# Patient Record
Sex: MALE | Race: AMERICAN INDIAN OR ALASKA NATIVE
[De-identification: names, ages, dates, MRNs, and addresses within clinical notes are randomized per-mention and may not be internally consistent; named-entity substitution may affect disease eponyms.]

---

## 2016-12-30 NOTE — EMERGENCY DEPARTMENT REPORT
ED Psych HPI





- General


Chief Complaint: Psych


Stated Complaint: MH EVAL


Time Seen by Provider: 12/30/16 16:37


Source: family, police


Mode of arrival: Ambulatory


Limitations: Altered Mental Status





- History of Present Illness


Initial Comments: 





46-year-old male presents to the emergency Department via law enforcement for 

mental health evaluation.  Per report, the patient has not slept in 3 days.  

Law enforcement reports that the patient was somewhat confrontational with 

them.  Further history is unable to be obtained from the patient due to his 

current clinical condition.


-: unknown


Associated Psychiatric Symptoms: racing thoughts


Quality: constant


Improves With: none


Worsens With: none


Treatments Prior to Arrival: placed on mental he





- Related Data


 Allergies











Allergy/AdvReac Type Severity Reaction Status Date / Time


 


Unable to Assess Allergy   Unverified 12/30/16 16:24














ED Review of Systems


ROS: 


Stated complaint: MH EVAL


Other details as noted in HPI





Comment: Unobtainable due to pts medical conditions





ED Past Medical Hx





- Past Medical History


Previous Medical History?: Yes


Hx Psychiatric Treatment: Yes (bipolar)





- Surgical History


Past Surgical History?: No





- Social History


Smoking Status: Never Smoker





ED Physical Exam





- General


Limitations: No Limitations


General appearance: alert





- Head


Head exam: Present: atraumatic, normocephalic





- Eye


Eye exam: Present: normal appearance, PERRL, EOMI





- ENT


ENT exam: Present: normal exam, normal orophraynx, mucous membranes moist





- Neck


Neck exam: Present: normal inspection, full ROM.  Absent: tenderness





- Respiratory


Respiratory exam: Present: normal lung sounds bilaterally.  Absent: respiratory 

distress





- Cardiovascular


Cardiovascular Exam: Present: normal rhythm, tachycardia, normal heart sounds





- GI/Abdominal


GI/Abdominal exam: Present: soft, normal bowel sounds.  Absent: distended, 

tenderness





- Extremities Exam


Extremities exam: Present: normal inspection, full ROM.  Absent: tenderness





- Back Exam


Back exam: Present: normal inspection, full ROM.  Absent: tenderness





- Neurological Exam


Neurological exam: Present: alert.  Absent: motor sensory deficit





- Psychiatric


Psychiatric exam: Present: manic, other (patient is hyperverbal with racing, 

tangential thoughts.  Patient not cooperative with questioning.)





- Skin


Skin exam: Present: warm, dry, intact





ED Course


 Vital Signs











  12/30/16 12/30/16





  16:46 16:55


 


Temperature 98.1 F 


 


Pulse Rate 148 H 


 


Respiratory  18





Rate  


 


Blood Pressure 136/96 


 


O2 Sat by Pulse 100 100





Oximetry  














- Reevaluation(s)


Reevaluation #1: 





12/30/16 17:01


Form 1013 has been signed and placed on the patient's chart.  IM Geodon was 

administered.  Checking labs for medical clearance.  Patient will need mental 

health evaluation.


Reevaluation #2: 





12/30/16 17:29


Patient has been medically cleared and evaluated by mental health.  Patient is 

currently awaiting placement.





ED Medical Decision Making





- Lab Data


Result diagrams: 


 12/30/16 16:26





 12/30/16 16:26





- Differential Diagnosis


bipolar disorder, oni


Critical care attestation.: 


If time is entered above; I have spent that time in minutes in the direct care 

of this critically ill patient, excluding procedure time.








ED Disposition


Clinical Impression: 


 Bipolar disorder with severe oni


Disposition: DC/TX PSY HOSP/PSY UNIT


Is pt being admited?: No


Condition: Stable


Time of Disposition: 17:30

## 2016-12-31 NOTE — EMERGENCY DEPARTMENT REPORT
Blank Doc





- Documentation


Documentation: 





Patient's medical record reviewed.  Called the lab to verify values: sodium is 

137, potassium 4.0, chloride 95.5, and anion gap of 29.  Patient was medicated 

initially upon arrival but no standing psychiatric medications ordered.  

Tachycardic at 148 and agitated upon arrival with most recent heart rate 112 

vitals this a.m.  Requested that nurse to repeat heart rate.  Patient awaiting 

acceptance.





Heart rate was 106 when rechecked.  I initiated IV fluids 1 L of normal saline.

  Went to room to assess patient while lying supine receiving IV fluids heart 

rate 96 and increases to 110 while I'm speaking to him.  Patient states he has 

a history of elevated heart rate chronically.  He denies chest pain, shortness 

of breath, Edema, calf tenderness, recent travel, or history of DVT.  Patient 

is currently oriented and cooperative.  He states he has a history of psychosis 

times one day then returns to normal and does not feel that he needs 

hospitalization.  He is requested to be discharged with rescinding of 1013 at 

this time.  I informed him I cannot rescind 1013 at this time however, instruct 

the nurse to order a CNS specialist consult for possible discharge tomorrow 

upon psychiatric clearance.

## 2017-01-01 NOTE — EMERGENCY DEPARTMENT REPORT
Blank Doc





- Documentation


Documentation: 





Vital signs reviewed and have improved with improvement in heart rate.  Patient 

has remained calm and cooperative to the ED.  Yesterday I instructed nurse 

Kristi to order a clinical nurse specialist.  The patient has not been 

performed.  At this time I have instructed the current nurse Earl Snyder to 

order the clinical nurse specialist evaluation for today since patient is no 

longer psychotic and cooperative and has not required additional medications.

## 2017-01-02 NOTE — EMERGENCY DEPARTMENT REPORT
Blank Doc





- Documentation


Documentation: 





Patient has been reassessed by mental health and has returned to his baseline 

mental status.  Recommendations are to rescind the 1013.  Reevaluation of the 

patient shows the patient is alert and oriented.  He is exhibiting no signs of 

psychosis at this time.  Form 1013 will be rescinded and the patient will be 

discharged home to follow-up as an outpatient.

## 2021-12-26 ENCOUNTER — HOSPITAL ENCOUNTER (EMERGENCY)
Dept: HOSPITAL 5 - ED | Age: 51
LOS: 1 days | Discharge: HOME | End: 2021-12-27
Payer: SELF-PAY

## 2021-12-26 DIAGNOSIS — F29: Primary | ICD-10-CM

## 2021-12-26 DIAGNOSIS — F17.200: ICD-10-CM

## 2021-12-26 DIAGNOSIS — F31.9: ICD-10-CM

## 2021-12-26 LAB
BASOPHILS # (AUTO): 0.1 K/MM3 (ref 0–0.1)
BASOPHILS NFR BLD AUTO: 0.6 % (ref 0–1.8)
BUN SERPL-MCNC: 19 MG/DL (ref 9–20)
BUN/CREAT SERPL: 14 %
CALCIUM SERPL-MCNC: 9.6 MG/DL (ref 8.4–10.2)
EOSINOPHIL # BLD AUTO: 0.3 K/MM3 (ref 0–0.4)
EOSINOPHIL NFR BLD AUTO: 3.2 % (ref 0–4.3)
HCT VFR BLD CALC: 48.1 % (ref 35.5–45.6)
HEMOLYSIS INDEX: 4
HGB BLD-MCNC: 15.2 GM/DL (ref 11.8–15.2)
LYMPHOCYTES # BLD AUTO: 2.7 K/MM3 (ref 1.2–5.4)
LYMPHOCYTES NFR BLD AUTO: 31.3 % (ref 13.4–35)
MCHC RBC AUTO-ENTMCNC: 32 % (ref 32–34)
MCV RBC AUTO: 81 FL (ref 84–94)
MONOCYTES # (AUTO): 0.7 K/MM3 (ref 0–0.8)
MONOCYTES % (AUTO): 8 % (ref 0–7.3)
PLATELET # BLD: 343 K/MM3 (ref 140–440)
RBC # BLD AUTO: 5.97 M/MM3 (ref 3.65–5.03)

## 2021-12-26 PROCEDURE — 80048 BASIC METABOLIC PNL TOTAL CA: CPT

## 2021-12-26 PROCEDURE — 80307 DRUG TEST PRSMV CHEM ANLYZR: CPT

## 2021-12-26 PROCEDURE — 36415 COLL VENOUS BLD VENIPUNCTURE: CPT

## 2021-12-26 PROCEDURE — 85025 COMPLETE CBC W/AUTO DIFF WBC: CPT

## 2021-12-26 PROCEDURE — 99283 EMERGENCY DEPT VISIT LOW MDM: CPT

## 2021-12-26 PROCEDURE — 80320 DRUG SCREEN QUANTALCOHOLS: CPT

## 2021-12-26 PROCEDURE — 81001 URINALYSIS AUTO W/SCOPE: CPT

## 2021-12-26 PROCEDURE — G0480 DRUG TEST DEF 1-7 CLASSES: HCPCS

## 2021-12-26 NOTE — EMERGENCY DEPARTMENT REPORT
HPI





- HPI


HPI: 





[facility]








The patient is a 51-year-old male present with a chief complaint of "agitation. 

"The patient lives at a group home and apparently 911 was called secondary to 

the patient being agitated.  No specifics about this agitation were given.  The 

patient is asked what happened he initially remained silent but smiles.  The 

patient eventually states "I suffer from psychosis" And then stops and smiles 

again.  When prompted again the patient replies "I guess I was excited about 

tomorrow" And then stops and smiles and does not answer any further questions 

except for denying suicidal ideation





<JULI WREN - Last Filed: 21 01:10>





<CHAVA TEJEDA - Last Filed: 21 11:20>





- General


Chief Complaint: Psych


Time Seen by Provider: 21 22:34





ED Past Medical Hx





- Past Medical History


Previous Medical History?: No


Hx Psychiatric Treatment: Yes (bipolar)





- Surgical History


Past Surgical History?: No





- Family History


Family history: no significant





- Social History


Smoking Status: Current Every Day Smoker (2/3 pack/day)


Substance Use Type: None





<JULI WREN - Last Filed: 21 01:10>





<CHAVA TEJEDA - Last Filed: 21 11:20>





- Medications


Home Medications: 


                                Home Medications











 Medication  Instructions  Recorded  Confirmed  Last Taken  Type


 


Quetiapine Fumarate [SEROquel] 400 mg PO QHS #30 tablet 21  Unknown Rx














ED Review of Systems


ROS: 


Stated complaint: psychosis


Other details as noted in HPI





Constitutional: no symptoms reported


Eyes: denies: eye pain


ENT: denies: throat pain


Respiratory: no symptoms reported


Cardiovascular: denies: chest pain


Endocrine: no symptoms reported


Gastrointestinal: denies: abdominal pain


Genitourinary: denies: dysuria


Neurological: denies: headache


Psychiatric: denies: suicidal thoughts





<JULI WREN - Last Filed: 21 01:10>


ROS: 


Stated complaint: psychosis


Other details as noted in HPI








<CHAVA TEJEDA - Last Filed: 21 11:20>





Physical Exam





- Physical Exam


Vital Signs: 


                                   Vital Signs











  21





  22:24


 


Temperature 98.4 F


 


Pulse Rate 112 H


 


Respiratory 18





Rate 


 


Blood Pressure 123/83





[Right] 


 


O2 Sat by Pulse 96





Oximetry 











Physical Exam: 





GENERAL: The patient is well-developed well-nourished male lying on stretcher 

not appearing to be in acute distress. []


HEENT: Normocephalic.  Atraumatic.  Extraocular motions are intact.  Patient has

 moist mucous membranes.


NECK: Supple.  Trachea midline


CHEST/LUNGS: Clear to auscultation.  There is no respiratory distress noted.


HEART/CARDIOVASCULAR: Regular.  There is no tachycardia.  There is no gallop rub

 or murmur.


ABDOMEN: Abdomen is soft, nontender.  Patient has normal bowel sounds.  There is

 no abdominal distention.


SKIN: There is no rash.  There is no edema.  There is no diaphoresis.


NEURO: The patient is awake, alert, and oriented.  The patient is cooperative.  

The patient has no focal neurologic deficits.  The patient has normal speech.  

GCS 15


MUSCULOSKELETAL: There is no evidence of acute injury.





<JULI WREN K - Last Filed: 21 01:10>





- Physical Exam


Vital Signs: 


                                   Vital Signs











  21





  22:24 01:07 01:52


 


Temperature 98.4 F  98.4 F


 


Pulse Rate 112 H  103 H


 


Respiratory 18  18





Rate   


 


Blood Pressure 123/83  100/79





[Right]   


 


O2 Sat by Pulse 96 98 98





Oximetry   














  21





  02:00


 


Temperature 98.4 F


 


Pulse Rate 103 H


 


Respiratory 18





Rate 


 


Blood Pressure 100/79





[Right] 


 


O2 Sat by Pulse 98





Oximetry 














<CHAVA TEJEDA C - Last Filed: 21 11:20>





ED Course


                                   Vital Signs











  21





  22:24


 


Temperature 98.4 F


 


Pulse Rate 112 H


 


Respiratory 18





Rate 


 


Blood Pressure 123/83





[Right] 


 


O2 Sat by Pulse 96





Oximetry 














<JULI WREN K - Last Filed: 21 01:10>


                                   Vital Signs











  21





  22:24 01:07 01:52


 


Temperature 98.4 F  98.4 F


 


Pulse Rate 112 H  103 H


 


Respiratory 18  18





Rate   


 


Blood Pressure 123/83  100/79





[Right]   


 


O2 Sat by Pulse 96 98 98





Oximetry   














  21





  02:00


 


Temperature 98.4 F


 


Pulse Rate 103 H


 


Respiratory 18





Rate 


 


Blood Pressure 100/79





[Right] 


 


O2 Sat by Pulse 98





Oximetry 














<CHAVA TEJEDA LANA - Last Filed: 21 11:20>





ED Medical Decision Making





- Lab Data


Result diagrams: 


                                 21 23:01





                                 21 23:01





                                Laboratory Tests











  21





  23:01 23:01 23:01


 


WBC  8.7  


 


RBC  5.97 H  


 


Hgb  15.2  


 


Hct  48.1 H  


 


MCV  81 L  


 


MCH  26 L  


 


MCHC  32  


 


RDW  16.5 H  


 


Plt Count  343  


 


Lymph % (Auto)  31.3  


 


Mono % (Auto)  8.0 H  


 


Eos % (Auto)  3.2  


 


Baso % (Auto)  0.6  


 


Lymph # (Auto)  2.7  


 


Mono # (Auto)  0.7  


 


Eos # (Auto)  0.3  


 


Baso # (Auto)  0.1  


 


Seg Neutrophils %  56.9  


 


Seg Neutrophils #  4.9  


 


Sodium   140 


 


Potassium   4.6 


 


Chloride   103.3 


 


Carbon Dioxide   25 


 


Anion Gap   16 


 


BUN   19 


 


Creatinine   1.4 H 


 


Estimated GFR   > 60 


 


BUN/Creatinine Ratio   14 


 


Glucose   114 H 


 


Calcium   9.6 


 


Urine Color   


 


Urine Turbidity   


 


Urine pH   


 


Ur Specific Gravity   


 


Urine Protein   


 


Urine Glucose (UA)   


 


Urine Ketones   


 


Urine Blood   


 


Urine Nitrite   


 


Urine Bilirubin   


 


Urine Urobilinogen   


 


Ur Leukocyte Esterase   


 


Urine WBC (Auto)   


 


Urine RBC (Auto)   


 


Urine Mucus   


 


Salicylates    < 0.3 L


 


Urine Opiates Screen   


 


Urine Methadone Screen   


 


Acetaminophen   


 


Ur Barbiturates Screen   


 


Ur Phencyclidine Scrn   


 


Ur Amphetamines Screen   


 


U Benzodiazepines Scrn   


 


Urine Cocaine Screen   


 


U Marijuana (THC) Screen   


 


Drugs of Abuse Note   


 


Plasma/Serum Alcohol   














  21





  23:01 23:01 00:41


 


WBC   


 


RBC   


 


Hgb   


 


Hct   


 


MCV   


 


MCH   


 


MCHC   


 


RDW   


 


Plt Count   


 


Lymph % (Auto)   


 


Mono % (Auto)   


 


Eos % (Auto)   


 


Baso % (Auto)   


 


Lymph # (Auto)   


 


Mono # (Auto)   


 


Eos # (Auto)   


 


Baso # (Auto)   


 


Seg Neutrophils %   


 


Seg Neutrophils #   


 


Sodium   


 


Potassium   


 


Chloride   


 


Carbon Dioxide   


 


Anion Gap   


 


BUN   


 


Creatinine   


 


Estimated GFR   


 


BUN/Creatinine Ratio   


 


Glucose   


 


Calcium   


 


Urine Color    Yellow


 


Urine Turbidity    Clear


 


Urine pH    5.0


 


Ur Specific Gravity    1.025


 


Urine Protein    <15 mg/dl


 


Urine Glucose (UA)    Neg


 


Urine Ketones    Neg


 


Urine Blood    Neg


 


Urine Nitrite    Neg


 


Urine Bilirubin    Neg


 


Urine Urobilinogen    < 2.0


 


Ur Leukocyte Esterase    Neg


 


Urine WBC (Auto)    1.0


 


Urine RBC (Auto)    1.0


 


Urine Mucus    Few


 


Salicylates   


 


Urine Opiates Screen   


 


Urine Methadone Screen   


 


Acetaminophen  5.0 L  


 


Ur Barbiturates Screen   


 


Ur Phencyclidine Scrn   


 


Ur Amphetamines Screen   


 


U Benzodiazepines Scrn   


 


Urine Cocaine Screen   


 


U Marijuana (THC) Screen   


 


Drugs of Abuse Note   


 


Plasma/Serum Alcohol   < 0.01 














  21





  00:41


 


WBC 


 


RBC 


 


Hgb 


 


Hct 


 


MCV 


 


MCH 


 


MCHC 


 


RDW 


 


Plt Count 


 


Lymph % (Auto) 


 


Mono % (Auto) 


 


Eos % (Auto) 


 


Baso % (Auto) 


 


Lymph # (Auto) 


 


Mono # (Auto) 


 


Eos # (Auto) 


 


Baso # (Auto) 


 


Seg Neutrophils % 


 


Seg Neutrophils # 


 


Sodium 


 


Potassium 


 


Chloride 


 


Carbon Dioxide 


 


Anion Gap 


 


BUN 


 


Creatinine 


 


Estimated GFR 


 


BUN/Creatinine Ratio 


 


Glucose 


 


Calcium 


 


Urine Color 


 


Urine Turbidity 


 


Urine pH 


 


Ur Specific Gravity 


 


Urine Protein 


 


Urine Glucose (UA) 


 


Urine Ketones 


 


Urine Blood 


 


Urine Nitrite 


 


Urine Bilirubin 


 


Urine Urobilinogen 


 


Ur Leukocyte Esterase 


 


Urine WBC (Auto) 


 


Urine RBC (Auto) 


 


Urine Mucus 


 


Salicylates 


 


Urine Opiates Screen  Presumptive negative


 


Urine Methadone Screen  Presumptive negative


 


Acetaminophen 


 


Ur Barbiturates Screen  Presumptive negative


 


Ur Phencyclidine Scrn  Presumptive negative


 


Ur Amphetamines Screen  Presumptive negative


 


U Benzodiazepines Scrn  Presumptive negative


 


Urine Cocaine Screen  Presumptive negative


 


U Marijuana (THC) Screen  Presumptive negative


 


Drugs of Abuse Note  Disclamer


 


Plasma/Serum Alcohol 














- Differential Diagnosis


History of agitation





<JULI WREN - Last Filed: 21 01:10>





- Lab Data


Result diagrams: 


                                 21 23:01





                                 21 23:01





<CHAVA TEJEDA - Last Filed: 21 11:20>


Critical care attestation.: 


If time is entered above; I have spent that time in minutes in the direct care 

of this critically ill patient, excluding procedure time.








<JULI WREN - Last Filed: 21 01:10>


Critical care attestation.: 


If time is entered above; I have spent that time in minutes in the direct care 

of this critically ill patient, excluding procedure time.








<CHAVA TEJEDA - Last Filed: 21 11:20>





ED Disposition





<JULI WREN - Last Filed: 21 01:10>


Is pt being admited?: No


Does the pt Need Aspirin: No





<CHAVA TEJEDA - Last Filed: 21 11:20>


Clinical Impression: 


 Psychosis





Disposition: 01 HOME / SELF CARE / HOMELESS


Condition: Stable


Additional Instructions: 


Professional and Agency Contacts To help Resolve Crises()


[place] Crisis Line: 6-933-383-9181


Suicide Prevention Line: 2-693-867-1835


Crisis Text Line: Text START to 589165


Emergency: 911





Outpatient COMMUNITY Behavioral Health Resources:





[place]: [facility]


[address] [place], [place] [address] 


Phone: [phone]





[place]:


[facility]


[address] [place] [address]


Phone: [phone]





[place]:


[facility] -


[address] [place] [address]


Phone: [phone]


Monday thru Friday - 8am - 5pm





[place]:


[facility]


Address: [address] [place] [address]


Phone: [phone]





[place]:


[facility]


Address: [address] [place] [address]


Monday thru Friday- 7am-2pm


Phone: [phone]





[facility]


Address: [address] [place] [address]


Monday thru Friday: 8:30AM-5PM


Phone: [phone]











In case of an emergency, please contact the following numbers:


[place] Crisis and Access Line: Number: 7-699-473-4268


Crisis Text Line: (Text START) Number: 787412


Suicide Prevention Line: Number: 5-627-566-3404


Emergency Number: 911





SUBSTANCE ABUSE PROGRAMS:





[facility]:


Location: [place], [place]


Phone: [phone] 





[place] [facility]


Address: [address] [place] [address]


Phone: [phone]





[facility]:


Address: [address] [place] [address]


Phone: [phone]





[facility]:


Address: [address] [place] [address] 


Phone: [phone]





[facility]:


Address: [address] [place] [address]


Phone: [phone]





[facility]


Address: [address] [place] [address]. 


Phone: [phone]





Please contact above numbers to attempt placement into free based program.





Medicaid Programs:





[facility]: 


Address: [address], [place] [address] 


Phone: [phone]





[facility]: 


Address: [address] [place] [address] 


Phone: [phone]











OUTPATIENT MENTAL HEALTH RESOURCES





[facility],


[address] [place] [address]


[phone]





[facility] Sinan Kwong MD:


[address]


[place] [address]


[phone]





[facility]:


[address] [place] [address]


[phone]





[place] [address]


 [phone]





[facility]:


[address]


[place] [address]


[phone]





[facility]:


 [address]


 [facility] [id number] [phone]





[facility]:


[address] [place], [place]


[phone]





Tobin Goodwin MD:


  [facility] [id number] [phone]





[facility]:


[address] [place], [place] [address] [phone]





**[place] CRISIS AND ACCESS LINE: 1 [phone]*


Prescriptions: 


Quetiapine Fumarate [SEROquel] 400 mg PO QHS #30 tablet


Referrals: 


PRIMARY CARE,MD [Primary Care Provider] - 3-5 Days

## 2021-12-27 VITALS — DIASTOLIC BLOOD PRESSURE: 79 MMHG | SYSTOLIC BLOOD PRESSURE: 100 MMHG

## 2021-12-27 LAB
BENZODIAZEPINES SCREEN,URINE: (no result)
BILIRUB UR QL STRIP: (no result)
BLOOD UR QL VISUAL: (no result)
METHADONE SCREEN,URINE: (no result)
MUCOUS THREADS #/AREA URNS HPF: (no result) /HPF
OPIATE SCREEN,URINE: (no result)
PH UR STRIP: 5 [PH] (ref 5–7)
PROT UR STRIP-MCNC: (no result) MG/DL
RBC #/AREA URNS HPF: 1 /HPF (ref 0–6)
UROBILINOGEN UR-MCNC: < 2 MG/DL (ref ?–2)
WBC #/AREA URNS HPF: 1 /HPF (ref 0–6)

## 2021-12-27 NOTE — EMERGENCY DEPARTMENT REPORT
Blank Doc





- Documentation


Documentation: 





51-year-old male with psychosis not on a 1013.  Patient has been cleared by [facility] and will be discharged with prescriptions and follow-up

## 2021-12-27 NOTE — CONSULTATION
History of Present Illness





- Reason for Consult


Consult date: 12/27/21


Reason for consult: psychosis





- History of Present Psychiatric Illness


The patient was seen today. He is a 50y/o male patient who was brought in from 

his group home for psychosis. During my evaluation of the patient he is calm, 

and cooperative. The patient is smiling when I walk up. He says "I suffer from 

psychosis." The patient says he started hearing voices, and "I was talking to 

myself, but I think I was just startled." The patient says he had an episode 

like this about a month ago and was at [facility]. He says "I have theses episodes 

but sometimes it's years in between." He says "prior to this happening a month 

ago it had been years since I had an episode like this." The patient says he josé

es seroquel 400mg nightly. He says he only has about a week left and need a 

refill. He says "they had me taking it twice a day, but it was making me too 

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 He says "at some point I would

like to get them back." It's unknown if this is true or not. The patient denies 

SI/HI. He says "absolutely not. I have never been that in my life." He also 

currently denies hallucinations of any kind. The patient says he is currently 

under the [facility] ACT team. He denies any illicit drug use, alcohol or nicotine.





PAST PSYCHIATRIC HISTORY


Diagnoses: Schizophrenia, Bipolar


Suicide attempts or Self-harm behavior: Denies


Prior psychiatric hospitalizations: Yes


Substance Abuse history: Denies


Previous psychiatric medications tried: Seroquel


Outpatient treatment: Unknown





PAST MEDICAL HISTORY: None reported





Family Psychiatric History: None reported or documented





SOCIAL HISTORY


Marital Status:  


Living Arrangements: Group Home


Employment Status: unemployed


Access to guns/weapons: Denies


Education: "law school"


History of Abuse: Denies


Legal History: None reported





REVIEW OF SYSTEMS


Constitutional: Negative for weight loss


ENT: Negative for stridor


Respiratory: Negative for cough or hemoptysis


All other systems reviewed and are negative


 


MENTAL STATUS EXAMINATION


General Appearance and Behavior: Age appropriate, good hygiene, not wearing 

appropriate clothes, good eye contact, cooperative polite with questioning.


Cooperation: Participating/engaged


Psychomotor Behavior: Psychomotor normal


Mood: okay


Affect and affective range:  Congruent with stated mood


Thought Process: Goal directed


Thought Content: None


Speech: normal tone and pace


Suicidal Ideation: Denies


Homicidal Ideation: Denies 


Hallucinations: Denies


Delusions: None elicited


Impulse Control: Limited


Insight and Judgment:  Limited insight and fair judgment


Memory: Normal


Attention:  Normal


Orientation: Alert, oriented,





 Assessment and Plan 


Schizophrenia





Treatment


Seroquel 400mg po qhs


Medical: Per primary


Sitter: Defer to primary


Disposition: Do not recommend acute inpatient treatment. 


Will sign off. Thanks


The patient to follow up with [facility] team 7 to 14 days upon discharge


Case staffed with Dr. Herndon  





Medications and Allergies


                                    Allergies











Allergy/AdvReac Type Severity Reaction Status Date / Time


 


No Known Allergies Allergy   Unverified 12/31/16 14:40











                                Home Medications











 Medication  Instructions  Recorded  Confirmed  Last Taken  Type


 


Quetiapine Fumarate [SEROquel] 400 mg PO QHS #30 tablet 12/27/21  Unknown Rx














Mental Status Exam





- Vital signs


                                Last Vital Signs











Temp  98.4 F   12/27/21 02:00


 


Pulse  103 H  12/27/21 02:00


 


Resp  18   12/27/21 02:00


 


BP  100/79   12/27/21 02:00


 


Pulse Ox  98   12/27/21 02:00














Results


Result Diagrams: 


                                 12/26/21 23:01





                                 12/26/21 23:01


                              Abnormal lab results











  12/26/21 12/26/21 12/26/21 Range/Units





  23:01 23:01 23:01 


 


RBC  5.97 H    (3.65-5.03)  M/mm3


 


Hct  48.1 H    (35.5-45.6)  %


 


MCV  81 L    (84-94)  fl


 


MCH  26 L    (28-32)  pg


 


RDW  16.5 H    (13.2-15.2)  %


 


Mono % (Auto)  8.0 H    (0.0-7.3)  %


 


Creatinine   1.4 H   (0.8-1.3)  mg/dL


 


Glucose   114 H   ()  mg/dL


 


Salicylates    < 0.3 L  (2.8-20.0)  mg/dL


 


Acetaminophen     (10.0-30.0)  ug/mL














  12/26/21 Range/Units





  23:01 


 


RBC   (3.65-5.03)  M/mm3


 


Hct   (35.5-45.6)  %


 


MCV   (84-94)  fl


 


MCH   (28-32)  pg


 


RDW   (13.2-15.2)  %


 


Mono % (Auto)   (0.0-7.3)  %


 


Creatinine   (0.8-1.3)  mg/dL


 


Glucose   ()  mg/dL


 


Salicylates   (2.8-20.0)  mg/dL


 


Acetaminophen  5.0 L  (10.0-30.0)  ug/mL








All other labs normal.